# Patient Record
Sex: FEMALE | Race: OTHER | NOT HISPANIC OR LATINO | URBAN - METROPOLITAN AREA
[De-identification: names, ages, dates, MRNs, and addresses within clinical notes are randomized per-mention and may not be internally consistent; named-entity substitution may affect disease eponyms.]

---

## 2018-01-12 ENCOUNTER — INPATIENT (INPATIENT)
Facility: HOSPITAL | Age: 63
LOS: 3 days | Discharge: HOME | End: 2018-01-16
Attending: INTERNAL MEDICINE

## 2018-01-12 DIAGNOSIS — E78.5 HYPERLIPIDEMIA, UNSPECIFIED: ICD-10-CM

## 2018-01-12 DIAGNOSIS — M54.9 DORSALGIA, UNSPECIFIED: ICD-10-CM

## 2018-01-12 DIAGNOSIS — I82.409 ACUTE EMBOLISM AND THROMBOSIS OF UNSPECIFIED DEEP VEINS OF UNSPECIFIED LOWER EXTREMITY: ICD-10-CM

## 2018-01-12 DIAGNOSIS — I10 ESSENTIAL (PRIMARY) HYPERTENSION: ICD-10-CM

## 2018-01-22 DIAGNOSIS — Z86.718 PERSONAL HISTORY OF OTHER VENOUS THROMBOSIS AND EMBOLISM: ICD-10-CM

## 2018-01-22 DIAGNOSIS — M54.5 LOW BACK PAIN: ICD-10-CM

## 2018-01-22 DIAGNOSIS — E78.5 HYPERLIPIDEMIA, UNSPECIFIED: ICD-10-CM

## 2018-01-22 DIAGNOSIS — M51.16 INTERVERTEBRAL DISC DISORDERS WITH RADICULOPATHY, LUMBAR REGION: ICD-10-CM

## 2018-01-22 DIAGNOSIS — J45.909 UNSPECIFIED ASTHMA, UNCOMPLICATED: ICD-10-CM

## 2018-01-22 DIAGNOSIS — R26.2 DIFFICULTY IN WALKING, NOT ELSEWHERE CLASSIFIED: ICD-10-CM

## 2018-01-22 DIAGNOSIS — F32.9 MAJOR DEPRESSIVE DISORDER, SINGLE EPISODE, UNSPECIFIED: ICD-10-CM

## 2018-01-22 DIAGNOSIS — I10 ESSENTIAL (PRIMARY) HYPERTENSION: ICD-10-CM

## 2018-01-22 DIAGNOSIS — G89.29 OTHER CHRONIC PAIN: ICD-10-CM

## 2018-01-22 DIAGNOSIS — Z79.01 LONG TERM (CURRENT) USE OF ANTICOAGULANTS: ICD-10-CM

## 2018-01-22 PROBLEM — Z00.00 ENCOUNTER FOR PREVENTIVE HEALTH EXAMINATION: Status: ACTIVE | Noted: 2018-01-22

## 2022-03-02 ENCOUNTER — EMERGENCY (EMERGENCY)
Facility: HOSPITAL | Age: 67
LOS: 0 days | Discharge: HOME | End: 2022-03-02
Admitting: EMERGENCY MEDICINE

## 2022-03-02 ENCOUNTER — INPATIENT (INPATIENT)
Facility: HOSPITAL | Age: 67
LOS: 0 days | Discharge: HOME | End: 2022-03-03
Attending: INTERNAL MEDICINE | Admitting: INTERNAL MEDICINE
Payer: MEDICARE

## 2022-03-02 VITALS
OXYGEN SATURATION: 99 % | RESPIRATION RATE: 18 BRPM | HEART RATE: 90 BPM | SYSTOLIC BLOOD PRESSURE: 168 MMHG | DIASTOLIC BLOOD PRESSURE: 70 MMHG | TEMPERATURE: 97 F

## 2022-03-02 DIAGNOSIS — Z79.01 LONG TERM (CURRENT) USE OF ANTICOAGULANTS: ICD-10-CM

## 2022-03-02 DIAGNOSIS — J45.909 UNSPECIFIED ASTHMA, UNCOMPLICATED: ICD-10-CM

## 2022-03-02 DIAGNOSIS — R07.89 OTHER CHEST PAIN: ICD-10-CM

## 2022-03-02 DIAGNOSIS — R68.84 JAW PAIN: ICD-10-CM

## 2022-03-02 DIAGNOSIS — I10 ESSENTIAL (PRIMARY) HYPERTENSION: ICD-10-CM

## 2022-03-02 DIAGNOSIS — Z86.718 PERSONAL HISTORY OF OTHER VENOUS THROMBOSIS AND EMBOLISM: ICD-10-CM

## 2022-03-02 DIAGNOSIS — R06.02 SHORTNESS OF BREATH: ICD-10-CM

## 2022-03-02 DIAGNOSIS — K21.9 GASTRO-ESOPHAGEAL REFLUX DISEASE WITHOUT ESOPHAGITIS: ICD-10-CM

## 2022-03-02 DIAGNOSIS — I25.111 ATHEROSCLEROTIC HEART DISEASE OF NATIVE CORONARY ARTERY WITH ANGINA PECTORIS WITH DOCUMENTED SPASM: ICD-10-CM

## 2022-03-02 DIAGNOSIS — E78.5 HYPERLIPIDEMIA, UNSPECIFIED: ICD-10-CM

## 2022-03-02 LAB
ALBUMIN SERPL ELPH-MCNC: 4.4 G/DL — SIGNIFICANT CHANGE UP (ref 3.5–5.2)
ALP SERPL-CCNC: 96 U/L — SIGNIFICANT CHANGE UP (ref 30–115)
ALT FLD-CCNC: 16 U/L — SIGNIFICANT CHANGE UP (ref 0–41)
ANION GAP SERPL CALC-SCNC: 15 MMOL/L — HIGH (ref 7–14)
APTT BLD: 28 SEC — SIGNIFICANT CHANGE UP (ref 27–39.2)
AST SERPL-CCNC: 17 U/L — SIGNIFICANT CHANGE UP (ref 0–41)
BASOPHILS # BLD AUTO: 0.05 K/UL — SIGNIFICANT CHANGE UP (ref 0–0.2)
BASOPHILS NFR BLD AUTO: 1 % — SIGNIFICANT CHANGE UP (ref 0–1)
BILIRUB SERPL-MCNC: 0.3 MG/DL — SIGNIFICANT CHANGE UP (ref 0.2–1.2)
BUN SERPL-MCNC: 19 MG/DL — SIGNIFICANT CHANGE UP (ref 10–20)
CALCIUM SERPL-MCNC: 9.1 MG/DL — SIGNIFICANT CHANGE UP (ref 8.5–10.1)
CHLORIDE SERPL-SCNC: 109 MMOL/L — SIGNIFICANT CHANGE UP (ref 98–110)
CO2 SERPL-SCNC: 25 MMOL/L — SIGNIFICANT CHANGE UP (ref 17–32)
CREAT SERPL-MCNC: 0.7 MG/DL — SIGNIFICANT CHANGE UP (ref 0.7–1.5)
EGFR: 97 ML/MIN/1.73M2 — SIGNIFICANT CHANGE UP
EOSINOPHIL # BLD AUTO: 0.08 K/UL — SIGNIFICANT CHANGE UP (ref 0–0.7)
EOSINOPHIL NFR BLD AUTO: 1.6 % — SIGNIFICANT CHANGE UP (ref 0–8)
GLUCOSE SERPL-MCNC: 82 MG/DL — SIGNIFICANT CHANGE UP (ref 70–99)
HCT VFR BLD CALC: 49.7 % — HIGH (ref 37–47)
HGB BLD-MCNC: 15.3 G/DL — SIGNIFICANT CHANGE UP (ref 12–16)
IMM GRANULOCYTES NFR BLD AUTO: 0.4 % — HIGH (ref 0.1–0.3)
INR BLD: 0.87 RATIO — SIGNIFICANT CHANGE UP (ref 0.65–1.3)
LYMPHOCYTES # BLD AUTO: 2.16 K/UL — SIGNIFICANT CHANGE UP (ref 1.2–3.4)
LYMPHOCYTES # BLD AUTO: 43.9 % — SIGNIFICANT CHANGE UP (ref 20.5–51.1)
MAGNESIUM SERPL-MCNC: 1.9 MG/DL — SIGNIFICANT CHANGE UP (ref 1.8–2.4)
MCHC RBC-ENTMCNC: 24.7 PG — LOW (ref 27–31)
MCHC RBC-ENTMCNC: 30.8 G/DL — LOW (ref 32–37)
MCV RBC AUTO: 80.2 FL — LOW (ref 81–99)
MONOCYTES # BLD AUTO: 0.47 K/UL — SIGNIFICANT CHANGE UP (ref 0.1–0.6)
MONOCYTES NFR BLD AUTO: 9.6 % — HIGH (ref 1.7–9.3)
NEUTROPHILS # BLD AUTO: 2.14 K/UL — SIGNIFICANT CHANGE UP (ref 1.4–6.5)
NEUTROPHILS NFR BLD AUTO: 43.5 % — SIGNIFICANT CHANGE UP (ref 42.2–75.2)
NRBC # BLD: 0 /100 WBCS — SIGNIFICANT CHANGE UP (ref 0–0)
NT-PROBNP SERPL-SCNC: 116 PG/ML — SIGNIFICANT CHANGE UP (ref 0–300)
PLATELET # BLD AUTO: 210 K/UL — SIGNIFICANT CHANGE UP (ref 130–400)
POTASSIUM SERPL-MCNC: 4.5 MMOL/L — SIGNIFICANT CHANGE UP (ref 3.5–5)
POTASSIUM SERPL-SCNC: 4.5 MMOL/L — SIGNIFICANT CHANGE UP (ref 3.5–5)
PROT SERPL-MCNC: 6.8 G/DL — SIGNIFICANT CHANGE UP (ref 6–8)
PROTHROM AB SERPL-ACNC: 10 SEC — SIGNIFICANT CHANGE UP (ref 9.95–12.87)
RBC # BLD: 6.2 M/UL — HIGH (ref 4.2–5.4)
RBC # FLD: 15.5 % — HIGH (ref 11.5–14.5)
SARS-COV-2 RNA SPEC QL NAA+PROBE: SIGNIFICANT CHANGE UP
SODIUM SERPL-SCNC: 149 MMOL/L — HIGH (ref 135–146)
TROPONIN T SERPL-MCNC: <0.01 NG/ML — SIGNIFICANT CHANGE UP
WBC # BLD: 4.92 K/UL — SIGNIFICANT CHANGE UP (ref 4.8–10.8)
WBC # FLD AUTO: 4.92 K/UL — SIGNIFICANT CHANGE UP (ref 4.8–10.8)

## 2022-03-02 PROCEDURE — 93306 TTE W/DOPPLER COMPLETE: CPT | Mod: 26

## 2022-03-02 PROCEDURE — 99285 EMERGENCY DEPT VISIT HI MDM: CPT | Mod: CS

## 2022-03-02 PROCEDURE — 93010 ELECTROCARDIOGRAM REPORT: CPT

## 2022-03-02 PROCEDURE — 99222 1ST HOSP IP/OBS MODERATE 55: CPT

## 2022-03-02 RX ORDER — RIVAROXABAN 15 MG-20MG
20 KIT ORAL
Refills: 0 | Status: DISCONTINUED | OUTPATIENT
Start: 2022-03-02 | End: 2022-03-03

## 2022-03-02 RX ORDER — ASPIRIN/CALCIUM CARB/MAGNESIUM 324 MG
325 TABLET ORAL ONCE
Refills: 0 | Status: COMPLETED | OUTPATIENT
Start: 2022-03-02 | End: 2022-03-02

## 2022-03-02 RX ORDER — FAMOTIDINE 10 MG/ML
40 INJECTION INTRAVENOUS
Refills: 0 | Status: DISCONTINUED | OUTPATIENT
Start: 2022-03-02 | End: 2022-03-03

## 2022-03-02 RX ORDER — MONTELUKAST 4 MG/1
10 TABLET, CHEWABLE ORAL DAILY
Refills: 0 | Status: DISCONTINUED | OUTPATIENT
Start: 2022-03-02 | End: 2022-03-03

## 2022-03-02 RX ORDER — SODIUM CHLORIDE 9 MG/ML
1000 INJECTION INTRAMUSCULAR; INTRAVENOUS; SUBCUTANEOUS
Refills: 0 | Status: DISCONTINUED | OUTPATIENT
Start: 2022-03-02 | End: 2022-03-02

## 2022-03-02 RX ORDER — ATORVASTATIN CALCIUM 80 MG/1
40 TABLET, FILM COATED ORAL AT BEDTIME
Refills: 0 | Status: DISCONTINUED | OUTPATIENT
Start: 2022-03-02 | End: 2022-03-03

## 2022-03-02 RX ORDER — ASPIRIN/CALCIUM CARB/MAGNESIUM 324 MG
81 TABLET ORAL DAILY
Refills: 0 | Status: DISCONTINUED | OUTPATIENT
Start: 2022-03-02 | End: 2022-03-02

## 2022-03-02 RX ORDER — CYCLOBENZAPRINE HYDROCHLORIDE 10 MG/1
10 TABLET, FILM COATED ORAL THREE TIMES A DAY
Refills: 0 | Status: DISCONTINUED | OUTPATIENT
Start: 2022-03-02 | End: 2022-03-03

## 2022-03-02 RX ORDER — OXYCODONE HYDROCHLORIDE 5 MG/1
10 TABLET ORAL EVERY 12 HOURS
Refills: 0 | Status: DISCONTINUED | OUTPATIENT
Start: 2022-03-02 | End: 2022-03-03

## 2022-03-02 RX ORDER — MORPHINE SULFATE 50 MG/1
2 CAPSULE, EXTENDED RELEASE ORAL ONCE
Refills: 0 | Status: DISCONTINUED | OUTPATIENT
Start: 2022-03-02 | End: 2022-03-02

## 2022-03-02 RX ORDER — INFLUENZA VIRUS VACCINE 15; 15; 15; 15 UG/.5ML; UG/.5ML; UG/.5ML; UG/.5ML
0.5 SUSPENSION INTRAMUSCULAR ONCE
Refills: 0 | Status: DISCONTINUED | OUTPATIENT
Start: 2022-03-02 | End: 2022-03-03

## 2022-03-02 RX ADMIN — Medication 325 MILLIGRAM(S): at 10:06

## 2022-03-02 RX ADMIN — MORPHINE SULFATE 2 MILLIGRAM(S): 50 CAPSULE, EXTENDED RELEASE ORAL at 15:34

## 2022-03-02 RX ADMIN — MORPHINE SULFATE 2 MILLIGRAM(S): 50 CAPSULE, EXTENDED RELEASE ORAL at 15:09

## 2022-03-02 RX ADMIN — MONTELUKAST 10 MILLIGRAM(S): 4 TABLET, CHEWABLE ORAL at 23:06

## 2022-03-02 RX ADMIN — FAMOTIDINE 40 MILLIGRAM(S): 10 INJECTION INTRAVENOUS at 17:19

## 2022-03-02 RX ADMIN — ATORVASTATIN CALCIUM 40 MILLIGRAM(S): 80 TABLET, FILM COATED ORAL at 21:19

## 2022-03-02 RX ADMIN — RIVAROXABAN 20 MILLIGRAM(S): KIT at 21:19

## 2022-03-02 NOTE — H&P CARDIOLOGY - COMMENTS
CP concerning for ACS  HTN  HLD    - STEMI code cancelled.   - Given high Ronda score, will take patient urgently for cath.  - Loaded with ASA 324mg in ED.  - Tele monitoring

## 2022-03-02 NOTE — ED ADULT TRIAGE NOTE - TEMPERATURE IN FAHRENHEIT (DEGREES F)
Problem: Anemia (Adult)  Goal: Identify Related Risk Factors and Signs and Symptoms  Related risk factors and signs and symptoms are identified upon initiation of Human Response Clinical Practice Guideline (CPG)   Outcome: Ongoing (interventions implemented as appropriate)  Pt here for blood and platelet transfusion. VSS.  No complaints  voiced.Consent/labs/meds/allergies/treatment reviewed.  Accessed per flowsheet.  All questions asked were answered. Will Continue to monitor        
Problem: Patient Care Overview  Goal: Plan of Care Review  Outcome: Ongoing (interventions implemented as appropriate)  Patient received 1 unit of blood and 1 unit of platelet without any issues. Notified to call MD with any further concerns . Vss. No apparent distress noted.         
97

## 2022-03-02 NOTE — ED PROVIDER NOTE - CLINICAL SUMMARY MEDICAL DECISION MAKING FREE TEXT BOX
I have personally performed a face to face diagnostic evaluation on this patient. I have reviewed the ACP note and agree with the history, exam and plan of care, except as noted.   66-year-old female with history of hypertension, hyperlipidemia, DVTs on Xarelto, compliant with this, non-smoker, no family history of CAD, presents with midsternal chest pressure radiating to the left jaw x2 days, worse with exertion. Associated dyspnea on exertion. Denies all other symptoms including fever, cough. Last COVID vaccine many months ago. On exam, afebrile, hemodynamically stable, saturating well, NAD, well appearing, sitting comfortably in bed, no WOB, speaking full sentences, head NCAT, EOMI grossly, anicteric, MMM, no JVD, RRR, nml S1/S2, no m/r/g, lungs CTAB, no w/r/r, abd soft, NT, ND, nml BS, no rebound or guarding, AAO, CN's 3-12 grossly intact, BOURGEOIS spontaneously, no leg cyanosis or edema, 2+ symmetric radial pulses, skin warm, well perfused, no rashes or hives. Character low suspicion for dissection and no murmur or pulse asymmetry. Character low suspicion for PE and no tachycardia, hypoxia, or e/o DVT and patient compliant on her Xarelto. No e/o PNA, PTX, or fluid overload on exam. Character of concern for ACS and ECG with some aVR elevation/II/III/aVF reciprocal changes. Code STEMI called on arrival and co-managed with cardiology at bedside, taking to cath. Patient well appearing, hemodynamically stable. Given ASA. Admitted to CCU for further monitoring, w/u, and care.

## 2022-03-02 NOTE — ED PROVIDER NOTE - OBJECTIVE STATEMENT
65 yo female, pmh of htn, hld, asthma, no tob use, no fh, left dvt on xarelto, presents to ed for cp, x3 days, midsternal, mild, radiates to left jaw. Denies fever, chills, abd pain, nvd. A/w exertional sob.

## 2022-03-02 NOTE — H&P CARDIOLOGY - OTHER
FINDINGS:     Coronary Dominance: right dominant    LM: normal    LAD: minor luminal irregularities  D1: mild disease  D2: minor irregularities    LCX: minor irregularities  OM1: 50% stenosis in the distal third of the vessel  OM2: normal    RCA: minor luminal irregularities     LVEDP: 24 mmHg     EF: 60% on LV gram

## 2022-03-02 NOTE — CONSULT NOTE ADULT - SUBJECTIVE AND OBJECTIVE BOX
HISTORY OF PRESENT ILLNESS:       PAST MEDICAL & SURGICAL HISTORY  HTN (hypertension)        FAMILY HISTORY:  FAMILY HISTORY:      SOCIAL HISTORY:  []smoker  []Alcohol  []Drug    ROS:  Negative except as mentioned in HPI    ALLERGIES:  No Known Allergies      MEDICATIONS:  MEDICATIONS  (STANDING):    MEDICATIONS  (PRN):      HOME MEDICATIONS:  Home Medications:      VITALS:   T(F): 97 (03-02 @ 09:45), Max: 97 (03-02 @ 09:45)  HR: 93 (03-02 @ 09:58) (90 - 93)  BP: 172/96 (03-02 @ 09:58) (168/70 - 172/96)  BP(mean): --  RR: 18 (03-02 @ 09:58) (18 - 18)  SpO2: 99% (03-02 @ 09:58) (99% - 99%)    I&O's Summary      PHYSICAL EXAM:  GEN: Not in acute distress  HEENT: NCAT, PERRL, EOMI  LUNGS: Clear to auscultation bilaterally   CARDIOVASCULAR: RRR, S1/S2 present, no murmurs, rubs or gallops, no JVD, + PP bilaterally  ABD: Soft, non-tender, non-distended  EXT: No YEFRI  SKIN: Intact  NEURO: AAOx3    LABS:                    Hemoglobin A1C   Thyroid      -CXR:  -TTE:  -CCTA:  -STRESS TEST:  -CATHETERIZATION:  -ECG:   -Telemetry:   HISTORY OF PRESENT ILLNESS:   67yo F hx of HTN, HLD, DVT on xarelto presented with cc of CP for past three days. Patient described the pain as acute onset. Left sided, non-pleuritic, non-reproducible in nature. Pain worsens with exertion and associated with SOB. Patient seen by PCP yesterday and was told to came to ED. In the ED, patient remains HD stable. Continue to experience pain.    PAST MEDICAL & SURGICAL HISTORY  HTN (hypertension)        FAMILY HISTORY:  FAMILY HISTORY:      SOCIAL HISTORY:  []smoker  []Alcohol  []Drug    ROS:  Negative except as mentioned in HPI    ALLERGIES:  No Known Allergies      MEDICATIONS:  MEDICATIONS  (STANDING):    MEDICATIONS  (PRN):      HOME MEDICATIONS:  Home Medications:      VITALS:   T(F): 97 (03-02 @ 09:45), Max: 97 (03-02 @ 09:45)  HR: 93 (03-02 @ 09:58) (90 - 93)  BP: 172/96 (03-02 @ 09:58) (168/70 - 172/96)  BP(mean): --  RR: 18 (03-02 @ 09:58) (18 - 18)  SpO2: 99% (03-02 @ 09:58) (99% - 99%)    I&O's Summary      PHYSICAL EXAM:  GEN: Not in acute distress  HEENT: NCAT, PERRL, EOMI  LUNGS: Clear to auscultation bilaterally   CARDIOVASCULAR: RRR, S1/S2 present, no murmurs, rubs or gallops, no JVD, + PP bilaterally  ABD: Soft, non-tender, non-distended  EXT: No YEFRI  SKIN: Intact  NEURO: AAOx3    LABS:  pending            Hemoglobin A1C   Thyroid      EKG: NSR, ST depression in inferior leads.

## 2022-03-02 NOTE — H&P CARDIOLOGY - HISTORY OF PRESENT ILLNESS
67 YO F hx of Chronic Essential HTN, HLD, DVT on Xarelto presented with complaints of CP x 3 days PTA Patient described the pain as acute onset. Left sided, non-pleuritic, non-reproducible in nature. Pain worsens with exertion and associated with SOB. Patient seen by PCP yesterday and was told to came to ED. In the ED, patient remained HD stable. Continued to experience pain. Code STEMI called d/t small BRAD in aVR but then cancelled, d/t high baldemar score taken urgently for LHC which revealed, small OM1 Dz.      Vital Signs Last 24 Hrs  T(F): 96.6 (02 Mar 2022 12:00), Max: 97 (02 Mar 2022 09:45)  HR: 66 (02 Mar 2022 13:00) (63 - 93)  BP: 152/81 (02 Mar 2022 13:00) (147/73 - 172/96)  BP(mean): 110 (02 Mar 2022 13:00) (101 - 110)  RR: 16 (02 Mar 2022 13:00) (14 - 31)  SpO2: 97% (02 Mar 2022 13:00) (97% - 99%)

## 2022-03-02 NOTE — CONSULT NOTE ADULT - ASSESSMENT
CP concerning for ACS  HTN  HLD    - STEMI code cancelled.   - Given high Ronda score, will take patient urgently for cath.  - Loaded with ASA 324mg in ED.  - Tele monitoring.  - Check TTE.  - Plan discussed with on call attending.

## 2022-03-02 NOTE — ED PROVIDER NOTE - CRITICAL CARE ATTENDING CONTRIBUTION TO CARE
I have personally performed a face to face diagnostic evaluation on this patient. I have reviewed the ACP note and agree with the history, exam and plan of care, except as noted.   66-year-old female with history of hypertension, hyperlipidemia, DVTs on Xarelto, compliant with this, non-smoker, no family history of CAD, presents with midsternal chest pressure radiating to the left jaw x2 days, worse with exertion. Associated dyspnea on exertion. Denies all other symptoms including fever, cough. Last COVID vaccine many months ago. On exam, afebrile, hemodynamically stable, saturating well, NAD, well appearing, sitting comfortably in bed, no WOB, speaking full sentences, head NCAT, EOMI grossly, anicteric, MMM, no JVD, RRR, nml S1/S2, no m/r/g, lungs CTAB, no w/r/r, abd soft, NT, ND, nml BS, no rebound or guarding, AAO, CN's 3-12 grossly intact, BOURGEOIS spontaneously, no leg cyanosis or edema, 2+ symmetric radial pulses, skin warm, well perfused, no rashes or hives. Character low suspicion for dissection and no murmur or pulse asymmetry. Character low suspicion for PE and no tachycardia, hypoxia, or e/o DVT and patient compliant on her Xarelto. No e/o PNA, PTX, or fluid overload on exam or CXR __________. Character of concern for ACS and ECG with some aVR elevation/II/III/aVF reciprocal changes. Code STEMI called on arrival and co-managed with cardiology at bedside. I have personally performed a face to face diagnostic evaluation on this patient. I have reviewed the ACP note and agree with the history, exam and plan of care, except as noted.   66-year-old female with history of hypertension, hyperlipidemia, DVTs on Xarelto, compliant with this, non-smoker, no family history of CAD, presents with midsternal chest pressure radiating to the left jaw x2 days, worse with exertion. Associated dyspnea on exertion. Denies all other symptoms including fever, cough. Last COVID vaccine many months ago. On exam, afebrile, hemodynamically stable, saturating well, NAD, well appearing, sitting comfortably in bed, no WOB, speaking full sentences, head NCAT, EOMI grossly, anicteric, MMM, no JVD, RRR, nml S1/S2, no m/r/g, lungs CTAB, no w/r/r, abd soft, NT, ND, nml BS, no rebound or guarding, AAO, CN's 3-12 grossly intact, BOURGEOIS spontaneously, no leg cyanosis or edema, 2+ symmetric radial pulses, skin warm, well perfused, no rashes or hives. Character low suspicion for dissection and no murmur or pulse asymmetry. Character low suspicion for PE and no tachycardia, hypoxia, or e/o DVT and patient compliant on her Xarelto. No e/o PNA, PTX, or fluid overload on exam. Character of concern for ACS and ECG with some aVR elevation/II/III/aVF reciprocal changes. Code STEMI called on arrival and co-managed with cardiology at bedside, taking to cath. Patient well appearing, hemodynamically stable. Given ASA. Admitted to CCU for further monitoring, w/u, and care.

## 2022-03-02 NOTE — CHART NOTE - NSCHARTNOTEFT_GEN_A_CORE
PRE-OP DIAGNOSIS:    STEMI Code, chest pain    PROCEDURE:     [x] Coronary Angiogram     [x] LHC     [x] LVG     [] RHC     [] Intervention (see below)         PHYSICIAN:  Dr. Lino    ASSISTANT:  Dr. Von Harrell       PROCEDURE DESCRIPTION:     Consent:      [x] Patient     [] Family Member     []  Used        Anesthesia:     [] General     [x] Sedation     [x] Local        Access & Closure:     [x] 6 Fr right Radial Artery (D-stat)    [] Fr Femoral Artery     [] Fr Femoral Vein     [] Fr Brachial Vein       IV Contrast: 60 mL        Intervention: none    Implants: none       FINDINGS:     Coronary Dominance: right dominant    LM: normal    LAD: minor luminal irregularities  D1: mild disease  D2: minor irregularities    LCX: minor irregularities  OM1: 50% stenosis in the distal third of the vessel  OM2: normal    RCA: minor luminal irregularities     LVEDP: 24 mmHg     EF: 60% on LV gram       ESTIMATED BLOOD LOSS: < 10 mL        CONDITION:     [x] Good     [] Fair     [] Critical        SPECIMEN REMOVED: N/A       POST-OP DIAGNOSIS:      [] Normal Coronary Angiogram     [x] Mild Coronary Artery Disease (< 50% stenosis)     [] __ Vessel Coronary Artery Disease        PLAN OF CARE:     [x] Return to In-patient bed     [x] Medications:   - continue with ASA 81mg, statin, and resume Xarelto (for hx of DVT) tonight    [x] IV Fluids: NS@100cc/hr x 4 hours    Consider ruling out PE, obtain 2d echo PRE-OP DIAGNOSIS:    STEMI Code, chest pain    PROCEDURE:     [x] Coronary Angiogram     [x] LHC     [x] LVG     [] RHC     [] Intervention (see below)         PHYSICIAN:  Dr. Lino    ASSISTANT:  Dr. Von Harrell       PROCEDURE DESCRIPTION:     Consent:      [x] Patient     [] Family Member     []  Used        Anesthesia:     [] General     [x] Sedation     [x] Local        Access & Closure:     [x] 6 Fr right Radial Artery (D-stat)    [] Fr Femoral Artery     [] Fr Femoral Vein     [] Fr Brachial Vein       IV Contrast: 60 mL        Intervention: none    Implants: none       FINDINGS:     Coronary Dominance: right dominant    LM: normal    LAD: minor luminal irregularities  D1: mild disease  D2: minor irregularities    LCX: minor irregularities  OM1: 50% stenosis in the distal third of the vessel  OM2: normal    RCA: minor luminal irregularities     LVEDP: 24 mmHg     EF: 60% on LV gram       ESTIMATED BLOOD LOSS: < 10 mL        CONDITION:     [x] Good     [] Fair     [] Critical        SPECIMEN REMOVED: N/A       POST-OP DIAGNOSIS:      [] Normal Coronary Angiogram     [x] Mild Coronary Artery Disease (< 50% stenosis)     [] __ Vessel Coronary Artery Disease        PLAN OF CARE:     [x] Return to In-patient bed     [x] Medications:   - Resume Xarelto (for hx of DVT) tonight  - check lipid profile and HBA1C    [x] IV Fluids: NS@100cc/hr x 4 hours    Consider ruling out PE, obtain 2d echo

## 2022-03-02 NOTE — ED PROVIDER NOTE - PROGRESS NOTE DETAILS
Per cardio, ASA alone at this time, CCU to [facility]. Per cardio Dr. Price, ASA alone at this time, CCU to [facility]. Per cardio Albert, will take fro cath within next 20 mins. stemi code cancelled. pt taken at this time to cath suite, per fellow d/c cxr. cards resident aware of pt.

## 2022-03-02 NOTE — ED PROVIDER NOTE - CARE PLAN
1 Principal Discharge DX:	Chest pain   Principal Discharge DX:	Chest pain, rule out acute myocardial infarction

## 2022-03-02 NOTE — ED ADULT NURSE NOTE - NSIMPLEMENTINTERV_GEN_ALL_ED
Implemented All Universal Safety Interventions:  [place] to call system. Call bell, personal items and telephone within reach. Instruct patient to call for assistance. Room bathroom lighting operational. Non-slip footwear when patient is off stretcher. Physically safe environment: no spills, clutter or unnecessary equipment. Stretcher in lowest position, wheels locked, appropriate side rails in place.

## 2022-03-02 NOTE — PATIENT PROFILE ADULT - FALL HARM RISK - HARM RISK INTERVENTIONS

## 2022-03-03 ENCOUNTER — TRANSCRIPTION ENCOUNTER (OUTPATIENT)
Age: 67
End: 2022-03-03

## 2022-03-03 VITALS — SYSTOLIC BLOOD PRESSURE: 131 MMHG | DIASTOLIC BLOOD PRESSURE: 71 MMHG

## 2022-03-03 LAB
A1C WITH ESTIMATED AVERAGE GLUCOSE RESULT: 6 % — HIGH (ref 4–5.6)
ANION GAP SERPL CALC-SCNC: 10 MMOL/L — SIGNIFICANT CHANGE UP (ref 7–14)
BUN SERPL-MCNC: 13 MG/DL — SIGNIFICANT CHANGE UP (ref 10–20)
CALCIUM SERPL-MCNC: 9.1 MG/DL — SIGNIFICANT CHANGE UP (ref 8.5–10.1)
CHLORIDE SERPL-SCNC: 104 MMOL/L — SIGNIFICANT CHANGE UP (ref 98–110)
CHOLEST SERPL-MCNC: 262 MG/DL — HIGH
CO2 SERPL-SCNC: 27 MMOL/L — SIGNIFICANT CHANGE UP (ref 17–32)
CREAT SERPL-MCNC: 0.7 MG/DL — SIGNIFICANT CHANGE UP (ref 0.7–1.5)
EGFR: 97 ML/MIN/1.73M2 — SIGNIFICANT CHANGE UP
ESTIMATED AVERAGE GLUCOSE: 126 MG/DL — HIGH (ref 68–114)
GLUCOSE SERPL-MCNC: 101 MG/DL — HIGH (ref 70–99)
HCT VFR BLD CALC: 41 % — SIGNIFICANT CHANGE UP (ref 37–47)
HDLC SERPL-MCNC: 78 MG/DL — SIGNIFICANT CHANGE UP
HGB BLD-MCNC: 13.2 G/DL — SIGNIFICANT CHANGE UP (ref 12–16)
LIPID PNL WITH DIRECT LDL SERPL: 175 MG/DL — HIGH
MAGNESIUM SERPL-MCNC: 2 MG/DL — SIGNIFICANT CHANGE UP (ref 1.8–2.4)
MCHC RBC-ENTMCNC: 25.5 PG — LOW (ref 27–31)
MCHC RBC-ENTMCNC: 32.2 G/DL — SIGNIFICANT CHANGE UP (ref 32–37)
MCV RBC AUTO: 79.2 FL — LOW (ref 81–99)
NON HDL CHOLESTEROL: 184 MG/DL — HIGH
NRBC # BLD: 0 /100 WBCS — SIGNIFICANT CHANGE UP (ref 0–0)
PLATELET # BLD AUTO: 207 K/UL — SIGNIFICANT CHANGE UP (ref 130–400)
POTASSIUM SERPL-MCNC: 4.7 MMOL/L — SIGNIFICANT CHANGE UP (ref 3.5–5)
POTASSIUM SERPL-SCNC: 4.7 MMOL/L — SIGNIFICANT CHANGE UP (ref 3.5–5)
RBC # BLD: 5.18 M/UL — SIGNIFICANT CHANGE UP (ref 4.2–5.4)
RBC # FLD: 15.4 % — HIGH (ref 11.5–14.5)
SODIUM SERPL-SCNC: 141 MMOL/L — SIGNIFICANT CHANGE UP (ref 135–146)
TRIGL SERPL-MCNC: 94 MG/DL — SIGNIFICANT CHANGE UP
WBC # BLD: 5.53 K/UL — SIGNIFICANT CHANGE UP (ref 4.8–10.8)
WBC # FLD AUTO: 5.53 K/UL — SIGNIFICANT CHANGE UP (ref 4.8–10.8)

## 2022-03-03 PROCEDURE — 93010 ELECTROCARDIOGRAM REPORT: CPT

## 2022-03-03 PROCEDURE — 71045 X-RAY EXAM CHEST 1 VIEW: CPT | Mod: 26

## 2022-03-03 PROCEDURE — 71275 CT ANGIOGRAPHY CHEST: CPT | Mod: 26

## 2022-03-03 PROCEDURE — 99233 SBSQ HOSP IP/OBS HIGH 50: CPT

## 2022-03-03 RX ORDER — CYCLOBENZAPRINE HYDROCHLORIDE 10 MG/1
0 TABLET, FILM COATED ORAL
Qty: 0 | Refills: 0 | DISCHARGE

## 2022-03-03 RX ORDER — AMLODIPINE BESYLATE 2.5 MG/1
1 TABLET ORAL
Qty: 30 | Refills: 0
Start: 2022-03-03 | End: 2022-04-01

## 2022-03-03 RX ORDER — AMLODIPINE BESYLATE 2.5 MG/1
5 TABLET ORAL DAILY
Refills: 0 | Status: DISCONTINUED | OUTPATIENT
Start: 2022-03-03 | End: 2022-03-03

## 2022-03-03 RX ORDER — CYCLOBENZAPRINE HYDROCHLORIDE 10 MG/1
1 TABLET, FILM COATED ORAL
Qty: 0 | Refills: 0 | DISCHARGE
Start: 2022-03-03

## 2022-03-03 RX ORDER — MONTELUKAST 4 MG/1
0 TABLET, CHEWABLE ORAL
Qty: 0 | Refills: 2 | DISCHARGE

## 2022-03-03 RX ORDER — ATORVASTATIN CALCIUM 80 MG/1
1 TABLET, FILM COATED ORAL
Qty: 30 | Refills: 0
Start: 2022-03-03 | End: 2022-04-01

## 2022-03-03 RX ORDER — FAMOTIDINE 10 MG/ML
0 INJECTION INTRAVENOUS
Qty: 0 | Refills: 1 | DISCHARGE

## 2022-03-03 RX ORDER — PANTOPRAZOLE SODIUM 20 MG/1
1 TABLET, DELAYED RELEASE ORAL
Qty: 30 | Refills: 0
Start: 2022-03-03 | End: 2022-04-01

## 2022-03-03 RX ORDER — RIVAROXABAN 15 MG-20MG
1 KIT ORAL
Qty: 30 | Refills: 2
Start: 2022-03-03 | End: 2022-05-31

## 2022-03-03 RX ORDER — OXYCODONE HYDROCHLORIDE 5 MG/1
0 TABLET ORAL
Qty: 0 | Refills: 0 | DISCHARGE

## 2022-03-03 RX ORDER — MONTELUKAST 4 MG/1
1 TABLET, CHEWABLE ORAL
Qty: 0 | Refills: 0 | DISCHARGE
Start: 2022-03-03

## 2022-03-03 RX ORDER — RIVAROXABAN 15 MG-20MG
0 KIT ORAL
Qty: 0 | Refills: 2 | DISCHARGE

## 2022-03-03 RX ORDER — ONDANSETRON 8 MG/1
4 TABLET, FILM COATED ORAL ONCE
Refills: 0 | Status: COMPLETED | OUTPATIENT
Start: 2022-03-03 | End: 2022-03-03

## 2022-03-03 RX ADMIN — FAMOTIDINE 40 MILLIGRAM(S): 10 INJECTION INTRAVENOUS at 17:51

## 2022-03-03 RX ADMIN — OXYCODONE HYDROCHLORIDE 10 MILLIGRAM(S): 5 TABLET ORAL at 04:57

## 2022-03-03 RX ADMIN — ONDANSETRON 4 MILLIGRAM(S): 8 TABLET, FILM COATED ORAL at 08:52

## 2022-03-03 RX ADMIN — FAMOTIDINE 40 MILLIGRAM(S): 10 INJECTION INTRAVENOUS at 07:04

## 2022-03-03 RX ADMIN — RIVAROXABAN 20 MILLIGRAM(S): KIT at 17:51

## 2022-03-03 RX ADMIN — MONTELUKAST 10 MILLIGRAM(S): 4 TABLET, CHEWABLE ORAL at 12:31

## 2022-03-03 RX ADMIN — AMLODIPINE BESYLATE 5 MILLIGRAM(S): 2.5 TABLET ORAL at 12:31

## 2022-03-03 NOTE — DISCHARGE NOTE PROVIDER - NSDCMRMEDTOKEN_GEN_ALL_CORE_FT
cyclobenzaprine 10 mg oral tablet: 1 tab(s) orally 3 times a day, As needed, Muscle Spasm  Lipitor 40 mg oral tablet: 1 tab(s) orally once a day (at bedtime)  montelukast 10 mg oral tablet: 1 tab(s) orally once a day  Norvasc 5 mg oral tablet: 1 tab(s) orally once a day  pantoprazole 40 mg oral delayed release tablet: 1 tab(s) orally once a day   Xarelto 20 mg tablet: 1 tab(s) orally once a day

## 2022-03-03 NOTE — PHYSICAL THERAPY INITIAL EVALUATION ADULT - PERTINENT HX OF CURRENT PROBLEM, REHAB EVAL
65 YO F hx of Chronic Essential HTN, HLD, DVT on Xarelto presented with complaints of CP x 3 days PTA Patient described the pain as acute onset. Left sided, non-pleuritic, non-reproducible in nature. Pain worsens with exertion and associated with SOB. Patient seen by PCP yesterday and was told to came to ED.

## 2022-03-03 NOTE — PROGRESS NOTE ADULT - SUBJECTIVE AND OBJECTIVE BOX
PATIENT:  HAKEEM REY  [id number]      CHIEF COMPLAINT:  Patient is a 66y old  Female who presents with a chief complaint of       HPI:      INTERVAL HISTORY/OVERNIGHT EVENTS:      MEDICATIONS:  MEDICATIONS  (STANDING):  atorvastatin 40 milliGRAM(s) Oral at bedtime  famotidine    Tablet 40 milliGRAM(s) Oral two times a day  influenza   Vaccine 0.5 milliLiter(s) IntraMuscular once  montelukast 10 milliGRAM(s) Oral daily  rivaroxaban 20 milliGRAM(s) Oral with dinner    MEDICATIONS  (PRN):  cyclobenzaprine 10 milliGRAM(s) Oral three times a day PRN Muscle Spasm  oxyCODONE    IR 10 milliGRAM(s) Oral every 12 hours PRN Moderate Pain (4 - 6)      ALLERGIES:  Allergies    No Known Allergies    Intolerances        OBJECTIVE:  ICU Vital Signs Last 24 Hrs  T(C): 36.4 (02 Mar 2022 20:00), Max: 36.4 (02 Mar 2022 16:00)  T(F): 97.6 (02 Mar 2022 20:00), Max: 97.6 (02 Mar 2022 20:00)  HR: 68 (03 Mar 2022 07:00) (63 - 93)  BP: 133/63 (03 Mar 2022 07:00) (112/57 - 172/96)  BP(mean): 90 (03 Mar 2022 07:00) (81 - 124)  ABP: --  ABP(mean): --  RR: 22 (03 Mar 2022 07:00) (13 - 31)  SpO2: 95% (03 Mar 2022 07:00) (95% - 99%)      Adult Advanced Hemodynamics Last 24 Hrs  CVP(mm Hg): --  CVP(cm H2O): --  CO: --  CI: --  PA: --  PA(mean): --  PCWP: --  SVR: --  SVRI: --  PVR: --  PVRI: --  CAPILLARY BLOOD GLUCOSE        CAPILLARY BLOOD GLUCOSE        I&O's Summary    02 Mar 2022 07:01  -  03 Mar 2022 07:00  --------------------------------------------------------  IN: 740 mL / OUT: 2525 mL / NET: -1785 mL      Daily Height in cm: 165.1 (02 Mar 2022 12:00)    Daily     PHYSICAL EXAMINATION:  General: WN/WD NAD  HEENT: PERRLA, EOMI, moist mucous membranes  Neurology: A&Ox3, nonfocal, BOURGEOIS x 4  Respiratory: CTA B/L, normal respiratory effort, no wheezes, crackles, rales  CV: RRR, S1S2, no murmurs, rubs or gallops  Abdominal: Soft, NT, ND +BS, Last BM  Extremities: No edema, + peripheral pulses  Incisions:   Tubes:    LABS:                          13.2   5.53  )-----------( 207      ( 03 Mar 2022 04:45 )             41.0     03-03    141  |  104  |  13  ----------------------------<  101<H>  4.7   |  27  |  0.7    Ca    9.1      03 Mar 2022 04:45  Mg     2.0     03-03    TPro  6.8  /  Alb  4.4  /  TBili  0.3  /  DBili  x   /  AST  17  /  ALT  16  /  AlkPhos  96  03-02    LIVER FUNCTIONS - ( 02 Mar 2022 09:56 )  Alb: 4.4 g/dL / Pro: 6.8 g/dL / ALK PHOS: 96 U/L / ALT: 16 U/L / AST: 17 U/L / GGT: x           PT/INR - ( 02 Mar 2022 09:56 )   PT: 10.00 sec;   INR: 0.87 ratio         PTT - ( 02 Mar 2022 09:56 )  PTT:28.0 sec  Troponin T, Serum: <0.01 ng/mL (03-02 @ 09:56)    CARDIAC MARKERS ( 02 Mar 2022 09:56 )  x     / <0.01 ng/mL / x     / x     / x              TELEMETRY:    EKG:     IMAGING:   PATIENT:  HAKEEM REY  [id number]      CHIEF COMPLAINT:  Patient is a 66y old  Female who presents with a chief complaint of chest pain      HPI:  65 YO F hx of Chronic Essential HTN, HLD, DVT on Xarelto presented with complaints of CP x 3 days PTA Patient described the pain as acute onset. Left sided, non-pleuritic, non-reproducible in nature. Pain worsens with exertion and associated with SOB. Patient seen by PCP yesterday and was told to came to ED. In the ED, patient remained HD stable. Continued to experience pain. Code STEMI called d/t small BRAD in aVR but then cancelled, d/t high baldemar score taken urgently for LHC which revealed, small OM1 Dz.    INTERVAL HISTORY/OVERNIGHT EVENTS:  had one episode of vomiting    MEDICATIONS:  MEDICATIONS  (STANDING):  atorvastatin 40 milliGRAM(s) Oral at bedtime  famotidine    Tablet 40 milliGRAM(s) Oral two times a day  influenza   Vaccine 0.5 milliLiter(s) IntraMuscular once  montelukast 10 milliGRAM(s) Oral daily  rivaroxaban 20 milliGRAM(s) Oral with dinner    MEDICATIONS  (PRN):  cyclobenzaprine 10 milliGRAM(s) Oral three times a day PRN Muscle Spasm  oxyCODONE    IR 10 milliGRAM(s) Oral every 12 hours PRN Moderate Pain (4 - 6)      ALLERGIES:  Allergies    No Known Allergies    Intolerances        OBJECTIVE:  ICU Vital Signs Last 24 Hrs  T(C): 36.4 (02 Mar 2022 20:00), Max: 36.4 (02 Mar 2022 16:00)  T(F): 97.6 (02 Mar 2022 20:00), Max: 97.6 (02 Mar 2022 20:00)  HR: 68 (03 Mar 2022 07:00) (63 - 93)  BP: 133/63 (03 Mar 2022 07:00) (112/57 - 172/96)  BP(mean): 90 (03 Mar 2022 07:00) (81 - 124)  ABP: --  ABP(mean): --  RR: 22 (03 Mar 2022 07:00) (13 - 31)  SpO2: 95% (03 Mar 2022 07:00) (95% - 99%)      Adult Advanced Hemodynamics Last 24 Hrs  CVP(mm Hg): --  CVP(cm H2O): --  CO: --  CI: --  PA: --  PA(mean): --  PCWP: --  SVR: --  SVRI: --  PVR: --  PVRI: --  CAPILLARY BLOOD GLUCOSE        CAPILLARY BLOOD GLUCOSE        I&O's Summary    02 Mar 2022 07:01  -  03 Mar 2022 07:00  --------------------------------------------------------  IN: 740 mL / OUT: 2525 mL / NET: -1785 mL      Daily Height in cm: 165.1 (02 Mar 2022 12:00)    Daily     PHYSICAL EXAMINATION:  General: WN/WD NAD  HEENT: PERRLA, EOMI, moist mucous membranes  Neurology: A&Ox3, nonfocal, BOURGEOIS x 4  Respiratory: CTA B/L, normal respiratory effort, no wheezes, crackles, rales  CV: RRR, S1S2, no murmurs, rubs or gallops  Abdominal: Soft, NT, ND +BS, Last BM  Extremities: No edema, + peripheral pulses  Incisions:   Tubes:    LABS:                          13.2   5.53  )-----------( 207      ( 03 Mar 2022 04:45 )             41.0     03-03    141  |  104  |  13  ----------------------------<  101<H>  4.7   |  27  |  0.7    Ca    9.1      03 Mar 2022 04:45  Mg     2.0     03-03    TPro  6.8  /  Alb  4.4  /  TBili  0.3  /  DBili  x   /  AST  17  /  ALT  16  /  AlkPhos  96  03-02    LIVER FUNCTIONS - ( 02 Mar 2022 09:56 )  Alb: 4.4 g/dL / Pro: 6.8 g/dL / ALK PHOS: 96 U/L / ALT: 16 U/L / AST: 17 U/L / GGT: x           PT/INR - ( 02 Mar 2022 09:56 )   PT: 10.00 sec;   INR: 0.87 ratio         PTT - ( 02 Mar 2022 09:56 )  PTT:28.0 sec  Troponin T, Serum: <0.01 ng/mL (03-02 @ 09:56)    CARDIAC MARKERS ( 02 Mar 2022 09:56 )  x     / <0.01 ng/mL / x     / x     / x              TELEMETRY:  no significant events  EKG:   Resolving ST depressions  IMAGING:  CTA: negative for PE

## 2022-03-03 NOTE — DISCHARGE NOTE PROVIDER - CARE PROVIDER_API CALL
Jordy Smalls)  Cardiovascular Disease; Internal Medicine  [address] [place] [address]  Phone: [phone]  Fax: [phone]  Follow Up Time: 2 weeks    Ervin Tran)  Gastroenterology; Internal Medicine  [address] [place] [address]  Phone: [phone]  Fax: [phone]  Follow Up Time: 2 weeks

## 2022-03-03 NOTE — DISCHARGE NOTE PROVIDER - HOSPITAL COURSE
65 YO F hx of Chronic Essential HTN, HLD, DVT on Xarelto presented with complaints of CP x 3 days PTA Patient described the pain as acute onset. Left sided, non-pleuritic, non-reproducible in nature. Pain worsens with exertion and associated with SOB. Patient seen by PCP yesterday and was told to came to ED. In the ED, patient remained HD stable. Continued to experience pain. Code STEMI called d/t small BRAD in aVR but then cancelled, d/t high baldemar score taken urgently for LHC which revealed, small OM1 Dz.    CNS: Avoid sedatives    HEENT: Oral care    PULMONARY:  HOB @ 45 degrees. ORA    CARDIOVASCULAR:  - Cath reveals non-obstructive CAD. No signs of ACS.  - Start amlodipine given concern for prinzmetal angina.  - Continue tele monitoring.  - TTE appreciated with largely normal results.    GI: GI prophylaxis.   - Will need GI w/u outpatient to r/o GI etiology for chest pain.    RENAL:   Strict I/O. Avoid nephrotoxic agents    INFECTIOUS DISEASE:     HEMATOLOGICAL:   Monitor CBC    ENDOCRINE:  Follow up FS.  Insulin protocol; F/U A1c  f/u Lipid profile    GI ppx: Pantoprazole  Dispo: Encourage ambulation. Likely dc later today or tomorrow.  Code status: FULL

## 2022-03-03 NOTE — DISCHARGE NOTE NURSING/CASE MANAGEMENT/SOCIAL WORK - PATIENT PORTAL LINK FT
You can access the FollowMyHealth Patient Portal offered by [facility] by registering at the following website: [email or web address]. By joining [employer]’s FollowMyHealth portal, you will also be able to view your health information using other applications (apps) compatible with our system.

## 2022-03-03 NOTE — PROGRESS NOTE ADULT - ASSESSMENT
IMPRESSION:  Atypical chest pain    PLAN:    CNS: Avoid sedatives    HEENT: Oral care    PULMONARY:  HOB @ 45 degrees. ORA    CARDIOVASCULAR:  - Cath reveals non-obstructive CAD. No signs of ACS.  - Start amlodipine given concern for prinzmetal angina.  - Continue tele monitoring.  - TTE appreciated with largely normal results.    GI: GI prophylaxis.   - Will need GI w/u outpatient to r/o GI etiology for chest pain.    RENAL:   Strict I/O. Avoid nephrotoxic agents    INFECTIOUS DISEASE:     HEMATOLOGICAL:   Monitor CBC    ENDOCRINE:  Follow up FS.  Insulin protocol; F/U A1c  f/u Lipid profile    GI ppx: Pantoprazole  Dispo: Encourage ambulation. Likely dc later today or tomorrow.  Code status: FULL

## 2022-03-03 NOTE — DISCHARGE NOTE PROVIDER - NSDCCPCAREPLAN_GEN_ALL_CORE_FT
PRINCIPAL DISCHARGE DIAGNOSIS  Diagnosis: Chest pain at rest  Assessment and Plan of Treatment: You came to the hospital with chest pain at rest and was found to have no significant blocakages on your coronary arteries. You will need to continue taking anti-hypertensive medications as indicated. Please follow up with a gastroenterologist for reflux disease.

## 2022-03-03 NOTE — DISCHARGE NOTE PROVIDER - CARE PROVIDERS DIRECT ADDRESSES
[email or web address]

## 2022-03-03 NOTE — PHYSICAL THERAPY INITIAL EVALUATION ADULT - GENERAL OBSERVATIONS, REHAB EVAL
Pt encountered supine in bed in NAD, +tele, no c/o pain and agreeable with PT. Pt performed bed mobility with supervision, transfer mobility CGA, and ambulated 30 ft CGA using RW. Pt demonstrates limited ambulation secondary fatigue easily. Pt left in bed as found after therapy session.

## 2022-03-03 NOTE — DISCHARGE NOTE PROVIDER - DISCHARGE DATE
Please see [employer] [employer] message below and advise.   Mahsa Winkler RN   [facility] - Triage     
03-Mar-2022

## 2022-03-03 NOTE — DISCHARGE NOTE PROVIDER - PROVIDER TOKENS
PROVIDER:[TOKEN:[[id number]],FOLLOWUP:[2 weeks]],PROVIDER:[TOKEN:[[id number]],FOLLOWUP:[2 weeks]]

## 2022-03-03 NOTE — DISCHARGE NOTE NURSING/CASE MANAGEMENT/SOCIAL WORK - NSDCPEFALRISK_GEN_ALL_CORE
For information on Fall & Injury Prevention, visit: [email or web address] OR  [email or web address] OR  https://www.cdc.gov/steadi/patient.html

## 2022-05-03 NOTE — ED ADULT TRIAGE NOTE - CCCP TRG CHIEF CMPLNT
chest pain Acitretin Counseling:  I discussed with the patient the risks of acitretin including but not limited to hair loss, dry lips/skin/eyes, liver damage, hyperlipidemia, depression/suicidal ideation, photosensitivity.  Serious rare side effects can include but are not limited to pancreatitis, pseudotumor cerebri, bony changes, clot formation/stroke/heart attack.  Patient understands that alcohol is contraindicated since it can result in liver toxicity and significantly prolong the elimination of the drug by many years.

## 2022-12-06 NOTE — ED PROVIDER NOTE - NS ED ROS FT
[FreeTextEntry6] : vomiting / stomach pain and headache \par + body aches yesterday \par 102.7 this AM \par entire class with Flu A last week  Constitutional: (-) fever, (-) chills  Eyes: (-) visual changes  ENT: (-) nasal congestions  Cardiovascular: (+) chest pain, (-) syncope  Respiratory: (-) cough, (+) shortness of breath, (-) dyspnea,   Gastrointestinal: (-) vomiting, (-) diarrhea, (-)nausea,  Musculoskeletal: (-) neck pain, (-) back pain, (-) joint pain,  Integumentary: (-) rash, (-) edema, (-) bruises  Neurological: (-) headache, (-) loc, (-) dizziness, (-) tingling, (-)numbness,  Peripheral Vascular: (-) leg swelling  :  (-)dysuria,  (-) hematuria  Allergic/Immunologic: (-) pruritus

## 2025-06-16 ENCOUNTER — EMERGENCY (EMERGENCY)
Facility: HOSPITAL | Age: 70
LOS: 0 days | Discharge: HOME | End: 2025-06-17
Attending: EMERGENCY MEDICINE
Payer: MEDICARE

## 2025-06-16 VITALS
TEMPERATURE: 98 F | SYSTOLIC BLOOD PRESSURE: 132 MMHG | WEIGHT: 145.06 LBS | HEART RATE: 90 BPM | DIASTOLIC BLOOD PRESSURE: 82 MMHG | HEIGHT: 65 IN | OXYGEN SATURATION: 97 % | RESPIRATION RATE: 20 BRPM

## 2025-06-16 DIAGNOSIS — R22.1 LOCALIZED SWELLING, MASS AND LUMP, NECK: ICD-10-CM

## 2025-06-16 DIAGNOSIS — E78.5 HYPERLIPIDEMIA, UNSPECIFIED: ICD-10-CM

## 2025-06-16 DIAGNOSIS — K21.9 GASTRO-ESOPHAGEAL REFLUX DISEASE WITHOUT ESOPHAGITIS: ICD-10-CM

## 2025-06-16 DIAGNOSIS — I10 ESSENTIAL (PRIMARY) HYPERTENSION: ICD-10-CM

## 2025-06-16 DIAGNOSIS — Z86.718 PERSONAL HISTORY OF OTHER VENOUS THROMBOSIS AND EMBOLISM: ICD-10-CM

## 2025-06-16 DIAGNOSIS — Z79.01 LONG TERM (CURRENT) USE OF ANTICOAGULANTS: ICD-10-CM

## 2025-06-16 LAB
ALBUMIN SERPL ELPH-MCNC: 4.2 G/DL — SIGNIFICANT CHANGE UP (ref 3.5–5.2)
ALP SERPL-CCNC: 66 U/L — SIGNIFICANT CHANGE UP (ref 30–115)
ALT FLD-CCNC: 17 U/L — SIGNIFICANT CHANGE UP (ref 0–41)
ANION GAP SERPL CALC-SCNC: 10 MMOL/L — SIGNIFICANT CHANGE UP (ref 7–14)
AST SERPL-CCNC: 19 U/L — SIGNIFICANT CHANGE UP (ref 0–41)
BASOPHILS # BLD AUTO: 0.04 K/UL — SIGNIFICANT CHANGE UP (ref 0–0.2)
BASOPHILS NFR BLD AUTO: 0.8 % — SIGNIFICANT CHANGE UP (ref 0–1)
BILIRUB SERPL-MCNC: 0.3 MG/DL — SIGNIFICANT CHANGE UP (ref 0.2–1.2)
BUN SERPL-MCNC: 13 MG/DL — SIGNIFICANT CHANGE UP (ref 10–20)
CALCIUM SERPL-MCNC: 9.4 MG/DL — SIGNIFICANT CHANGE UP (ref 8.4–10.5)
CHLORIDE SERPL-SCNC: 104 MMOL/L — SIGNIFICANT CHANGE UP (ref 98–110)
CO2 SERPL-SCNC: 31 MMOL/L — SIGNIFICANT CHANGE UP (ref 17–32)
CREAT SERPL-MCNC: 0.7 MG/DL — SIGNIFICANT CHANGE UP (ref 0.7–1.5)
EGFR: 95 ML/MIN/1.73M2 — SIGNIFICANT CHANGE UP
EGFR: 95 ML/MIN/1.73M2 — SIGNIFICANT CHANGE UP
EOSINOPHIL # BLD AUTO: 0.19 K/UL — SIGNIFICANT CHANGE UP (ref 0–0.7)
EOSINOPHIL NFR BLD AUTO: 3.9 % — SIGNIFICANT CHANGE UP (ref 0–8)
GLUCOSE SERPL-MCNC: 90 MG/DL — SIGNIFICANT CHANGE UP (ref 70–99)
HCT VFR BLD CALC: 41.1 % — SIGNIFICANT CHANGE UP (ref 37–47)
HGB BLD-MCNC: 13.1 G/DL — SIGNIFICANT CHANGE UP (ref 12–16)
IMM GRANULOCYTES NFR BLD AUTO: 0.2 % — SIGNIFICANT CHANGE UP (ref 0.1–0.3)
LYMPHOCYTES # BLD AUTO: 2.11 K/UL — SIGNIFICANT CHANGE UP (ref 1.2–3.4)
LYMPHOCYTES # BLD AUTO: 43.1 % — SIGNIFICANT CHANGE UP (ref 20.5–51.1)
MCHC RBC-ENTMCNC: 25.3 PG — LOW (ref 27–31)
MCHC RBC-ENTMCNC: 31.9 G/DL — LOW (ref 32–37)
MCV RBC AUTO: 79.5 FL — LOW (ref 81–99)
MONOCYTES # BLD AUTO: 0.65 K/UL — HIGH (ref 0.1–0.6)
MONOCYTES NFR BLD AUTO: 13.3 % — HIGH (ref 1.7–9.3)
NEUTROPHILS # BLD AUTO: 1.9 K/UL — SIGNIFICANT CHANGE UP (ref 1.4–6.5)
NEUTROPHILS NFR BLD AUTO: 38.7 % — LOW (ref 42.2–75.2)
NRBC BLD AUTO-RTO: 0 /100 WBCS — SIGNIFICANT CHANGE UP (ref 0–0)
PLATELET # BLD AUTO: 239 K/UL — SIGNIFICANT CHANGE UP (ref 130–400)
PMV BLD: 11.4 FL — HIGH (ref 7.4–10.4)
POTASSIUM SERPL-MCNC: 4.3 MMOL/L — SIGNIFICANT CHANGE UP (ref 3.5–5)
POTASSIUM SERPL-SCNC: 4.3 MMOL/L — SIGNIFICANT CHANGE UP (ref 3.5–5)
PROT SERPL-MCNC: 6.8 G/DL — SIGNIFICANT CHANGE UP (ref 6–8)
RBC # BLD: 5.17 M/UL — SIGNIFICANT CHANGE UP (ref 4.2–5.4)
RBC # FLD: 15 % — HIGH (ref 11.5–14.5)
SODIUM SERPL-SCNC: 145 MMOL/L — SIGNIFICANT CHANGE UP (ref 135–146)
WBC # BLD: 4.9 K/UL — SIGNIFICANT CHANGE UP (ref 4.8–10.8)
WBC # FLD AUTO: 4.9 K/UL — SIGNIFICANT CHANGE UP (ref 4.8–10.8)

## 2025-06-16 PROCEDURE — 76882 US LMTD JT/FCL EVL NVASC XTR: CPT | Mod: 26,LT

## 2025-06-16 PROCEDURE — 85025 COMPLETE CBC W/AUTO DIFF WBC: CPT

## 2025-06-16 PROCEDURE — 74177 CT ABD & PELVIS W/CONTRAST: CPT

## 2025-06-16 PROCEDURE — 71260 CT THORAX DX C+: CPT | Mod: 26

## 2025-06-16 PROCEDURE — 80053 COMPREHEN METABOLIC PANEL: CPT

## 2025-06-16 PROCEDURE — 76882 US LMTD JT/FCL EVL NVASC XTR: CPT | Mod: LT

## 2025-06-16 PROCEDURE — 70491 CT SOFT TISSUE NECK W/DYE: CPT | Mod: 26

## 2025-06-16 PROCEDURE — 99285 EMERGENCY DEPT VISIT HI MDM: CPT

## 2025-06-16 PROCEDURE — 36000 PLACE NEEDLE IN VEIN: CPT

## 2025-06-16 PROCEDURE — 70491 CT SOFT TISSUE NECK W/DYE: CPT

## 2025-06-16 PROCEDURE — 74177 CT ABD & PELVIS W/CONTRAST: CPT | Mod: 26

## 2025-06-16 PROCEDURE — 71260 CT THORAX DX C+: CPT

## 2025-06-16 PROCEDURE — 99284 EMERGENCY DEPT VISIT MOD MDM: CPT | Mod: 25

## 2025-06-16 PROCEDURE — 36415 COLL VENOUS BLD VENIPUNCTURE: CPT

## 2025-06-16 NOTE — ED PROVIDER NOTE - CLINICAL SUMMARY MEDICAL DECISION MAKING FREE TEXT BOX
Received sign out from Dr. Peres -- patient presented with soft, well circumscribed swelling to L side of neck, found to have ?venous aneurysm, Was seen by vascular resident who spoke with fellow. No indication for admission, emergent intervention, can be discharged with outpatient follow up with vascular.

## 2025-06-16 NOTE — ED PROVIDER NOTE - PATIENT PORTAL LINK FT
You can access the FollowMyHealth Patient Portal offered by [facility] by registering at the following website: [email or web address]. By joining [facility]’s FollowMyHealth portal, you will also be able to view your health information using other applications (apps) compatible with our system.

## 2025-06-16 NOTE — ED PROVIDER NOTE - PHYSICAL EXAMINATION
VITAL SIGNS: I have reviewed nursing notes and confirm.  CONSTITUTIONAL: Well-developed; well-nourished; in no acute distress.  SKIN: Skin exam is warm and dry, no acute rash.  HEAD: Normocephalic; atraumatic.  NECK: Supple; Tender left submandibular lymph node.  Outpouching/vessel noted next to the left external jugular.  CARD: S1, S2 normal; no murmurs, gallops, or rubs. Regular rate and rhythm.  RESP: Normal respiratory effort, no tachypnea or distress. Lungs CTAB, no wheezes, rales or rhonchi.  ABD: soft, NT/ND.  NEURO: Alert, oriented. Grossly unremarkable. No focal deficits.  PSYCH: Cooperative, appropriate.

## 2025-06-16 NOTE — ED PROVIDER NOTE - NSFOLLOWUPINSTRUCTIONS_ED_ALL_ED_FT
Our Emergency Department Referral Coordinators will be reaching out to you in the next 24-48 hours from 9:00am to 5:00pm to schedule a follow up appointment with vascular surgery. Please expect a phone call from the hospital in that time frame. If you do not receive a call or if you have any questions or concerns, you can reach them at [phone].  ----  Vascular Malformation    A vascular malformation is a defect in the development of a blood or lymph vessel. Vascular malformations are present at birth, but they may not cause signs or symptoms until years later. Vascular malformations can occur anywhere that blood or lymph circulates in your body. Lymph is fluid that is part of the body's disease-fighting (immune) system.    There are four types of vascular malformations.  - Venous malformations. These affect the veins, which are blood vessels that carry blood back to the heart. These are the most common type.  - Arteriovenous malformations. These involve veins and arteries. Arteries are blood vessels that carry oxygen-rich blood away from the heart.  - Lymphatic malformations. These affect lymph vessels. These vessels carry fluid through the body that helps to fight infections.  - Capillary malformations. These affect capillaries, which are tiny blood vessels that connect veins to arteries.    What are the causes?  This condition is caused by a defect that occurs before a child is born. In most cases, the cause of the error is not known. In some cases, it may be caused by a change in a gene (mutation).    What are the signs or symptoms?  The main symptom of this condition is a skin blemish or lump that swells, bleeds, or is painful. Other signs and symptoms depend on the type, location, and size of the malformation.    Venous malformations. These commonly appear on the face, arms, legs, or the area between the neck and groin (torso). They may have a bluish appearance.    Arteriovenous malformations. These have different symptoms depending on where they grow in the body.  Brain malformations may cause a headache or seizure. They can also cause weakness, dizziness, confusion, loss of speech, or other symptoms of a bleeding (hemorrhagic) stroke. These malformations can be life-threatening.    Malformations in the head or neck may cause vision changes or difficulty with breathing or swallowing.    Spinal cord malformations may cause back pain, weakness, or inability to feel or move (paralysis).    Chest malformations may cause shortness of breath, fatigue, a cough, or coughing up blood.    Lymphatic malformations. These cause swelling on the face and neck.    Capillary malformations. These may look like a purple stain (port-wine stain) on the face. Over time, the stain may become darker and the skin may become thicker.    How is this diagnosed?  This condition may be diagnosed based on:  - A physical exam. Some malformations can be seen or felt on the body.  - Tests that create detailed images of the body. These may include:  - Ultrasound. This test uses sound waves to make images.  - MRI. This test is used to diagnose deep vascular malformations.  - CT scan. This uses X-rays to make a three-dimensional image.  - Angiogram. This is a type of X-ray that is taken after a dye is injected into an artery. This imaging is most important for diagnosing arteriovenous malformations.    How is this treated?  Treatment depends on the type, size, and location of the malformation as well as on any symptoms. In some cases, treatment may not be needed. The malformation will be checked often to see if it is growing. If needed, treatment may include:  - Laser treatment. This may be used to treat malformations near the skin. This is often the best treatment for capillary malformations.  - Embolization. This treatment blocks blood flow and shrinks the malformation.  - Sclerotherapy. An irritating substance is inserted into a blood vessel or lymphatic vessel. This causes the vessel to shrink and become a scar.  - Radiosurgery. This is an X-ray treatment to destroy a specific spot or to shrink a malformation. You may have this treatment to treat arteriovenous malformations in the brain or spinal cord.  - Surgery. Some vascular malformations can be removed surgically. You may have other treatments to shrink the malformation before surgery.    Follow these instructions at home:    Take over-the-counter and prescription medicines only as told by your health care provider.  Return to your normal activities as told by your health care provider. Ask your health care provider what activities are safe for you.  Keep all follow-up visits. This is important.    Contact a health care provider if:  - You have a fever.  - You have pain.  - You have a vascular malformation near your skin that changes size, bleeds, or becomes painful.  - You have a cough.  - You have difficulty swallowing.  - You have a headache, backache, or loss of feeling (numbness).  - You have dizziness, weakness, or confusion.    Get help right away if:  - You have trouble breathing.  - You have severe bleeding.  - You cough up blood.  - You have a severe headache or back pain.  - You have loss of movement.  - You have any symptoms of a stroke. "BE FAST" is an easy way to remember the main warning signs of a stroke:  B - Balance. Signs are dizziness, sudden trouble walking, or loss of balance.  E - Eyes. Signs are trouble seeing or a sudden change in vision.  F - Face. Signs are sudden weakness or numbness of the face, or the face or eyelid drooping on one side.  A - Arms. Signs are weakness or numbness in an arm. This happens suddenly and usually on one side of the body.  S - Speech. Signs are sudden trouble speaking, slurred speech, or trouble understanding what people say.  T - Time. Time to call emergency services. Write down what time symptoms started.  - You have other signs of a stroke, such as:        - A sudden, severe headache with no known cause.        - Nausea or vomiting.        - Seizure.  These symptoms may represent a serious problem that is an emergency. Do not wait to see if the symptoms will go away. Get medical help right away. Call your local emergency services (911 in the [place] Do not drive yourself to the hospital.    Summary  A vascular malformation is an abnormal development of a blood or lymph vessel that you are born with.  Symptoms depend on the type, size, and location of the malformation that you have.  You might not develop symptoms for many years.  Imaging studies are needed to diagnose vascular malformations inside the body.  The best treatment will depend on the type, size, symptoms, and location of the malformation.  This information is not intended to replace advice given to you by your health care provider. Make sure you discuss any questions you have with your health care provider.

## 2025-06-16 NOTE — CONSULT NOTE ADULT - ASSESSMENT
Assessment:  69 y.o. F w/ PMH of HTN, HLD, GERD, DVT of LLE (7 years ago) on xarelto since who presents with lump to L neck that is present for about 5 month and is getting bigger since.     Vascular surgery got consulted for L neck lump concern for venous aneurism.    Plan:  ***INCOMPLETE NOTE***    Above plan discussed with fellow Dr. Baltazar Assessment:  69 y.o. F w/ PMH of HTN, HLD, GERD, DVT of LLE (7 years ago) on xarelto since who presents with lump to L neck that is present for about 5 month and is getting bigger since.     Vascular surgery got consulted for L neck lump concern for venous aneurism.    Plan:  - No acute vascular surgery intervention  - Pt is on Xarelto  - Pt can f/u outpatient w/ Dr. Garcia     Above plan discussed with fellow Dr. Baltazar

## 2025-06-16 NOTE — ED PROVIDER NOTE - PROGRESS NOTE DETAILS
TC: Labs wnl. CT chest/abd/pelv negative. CT neck prelim read with venous aneurysm vs enlarged necrotic lymph node. POCUS more c/w focal venous aneurysm near EJ. Vascular surgery consulted. TC: Signed out to Dr. Youngblood to f/u vascular reccs. TC: Labs wnl. CT chest/abd/pelv negative. CT neck prelim read with venous aneurysm vs enlarged necrotic lymph node. POCUS more c/w focal venous aneurysm near EJ. Discussed findings and plan with jamari Saldivar over phone. Vascular surgery consulted.

## 2025-06-16 NOTE — ED PROVIDER NOTE - OBJECTIVE STATEMENT
69-year-old female with a past medical history of hypertension, hyperlipidemia, DVT on Xarelto presents to the emergency department with 3 weeks to of swelling to the left side of her neck.  Patient notes that the swelling is next to her vein and has been getting bigger in the past few days.  Patient also complains of swelling of the left submandibular lymph node which is also tender to palpation.  Patient reports changes to her voice stating that her voice is somewhat shaky.  Denies dysphagia, fever, chills, night sweats.  Patient states that she has lost weight but has been dieting.

## 2025-06-16 NOTE — ED PROVIDER NOTE - NSICDXPASTMEDICALHX_GEN_ALL_CORE_FT
PAST MEDICAL HISTORY:  Deep vein thrombosis (DVT)     GERD (gastroesophageal reflux disease)     HLD (hyperlipidemia)     HTN (hypertension)

## 2025-06-16 NOTE — CONSULT NOTE ADULT - SUBJECTIVE AND OBJECTIVE BOX
VASCULAR SURGERY CONSULT NOTE    HPI:  69 y.o. F w/ PMH of HTN, HLD, GERD, DVT of LLE (7 years ago) on xarelto since who presents with lump to L neck that is present for about 5 month and is getting bigger since. The lump is soft and mobile, was not painful till past few days and now feels like there is painful swelling to other part of L neck and part of R neck as well. Pt is stating when pressing on it is mildly painful but pt is experiencing difficulty with swallowing. Pt also endorses change in voice as well. Denies any fever, night sweats, sore throat. Pt never smoked. Has family Hs of thyroid CA in uncle uterine CA in 2 aunts, lung cancer in sister (not a smoker).    Vascular surgery got consulted for L neck lump concern for venous aneurism.    On PE: soft mobile round mass on the left side of the neck, easily compressible Getting bigger with lying flat and with sitting almost disappears. No bruit felt over the lump.    PAST MEDICAL & SURGICAL HISTORY:  HTN (hypertension)      HLD (hyperlipidemia)      Deep vein thrombosis (DVT)      GERD (gastroesophageal reflux disease)        No Known Allergies    Home Medications:  cyclobenzaprine 10 mg oral tablet: 1 tab(s) orally 3 times a day, As needed, Muscle Spasm (03 Mar 2022 11:18)  montelukast 10 mg oral tablet: 1 tab(s) orally once a day (03 Mar 2022 11:18)    PHYSICAL EXAM  Vital Signs Last 24 Hrs  T(C): 36.8 (16 Jun 2025 16:27), Max: 36.8 (16 Jun 2025 16:27)  T(F): 98.2 (16 Jun 2025 16:27), Max: 98.2 (16 Jun 2025 16:27)  HR: 90 (16 Jun 2025 16:27) (90 - 90)  BP: 132/82 (16 Jun 2025 16:27) (132/82 - 132/82)  BP(mean): --  RR: 20 (16 Jun 2025 16:27) (20 - 20)  SpO2: 97% (16 Jun 2025 16:27) (97% - 97%)    Parameters below as of 16 Jun 2025 16:27  Patient On (Oxygen Delivery Method): room air    Appearance: Normal	  HEENT: Normal oral mucosa, EOMI	  Neck: L sided lump that is getting bigger with supine and getting smaller with sitting   Cardiovascular: RRR  Respiratory: On RA, saturating well, b/l chest rise and fall	  Skin: No rashes, No ecchymoses, No cyanosis  Extremities: Normal range of motion, No clubbing, cyanosis or edema  Neurologic: Non-focal deficit  Psychiatry: A & O x 3, Mood & affect appropriate    MEDICATIONS:   MEDICATIONS  (STANDING):    MEDICATIONS  (PRN):      LAB/STUDIES:                        13.1   4.90  )-----------( 239      ( 16 Jun 2025 18:00 )             41.1     06-16    145  |  104  |  13  ----------------------------<  90  4.3   |  31  |  0.7    Ca    9.4      16 Jun 2025 18:00    TPro  6.8  /  Alb  4.2  /  TBili  0.3  /  DBili  x   /  AST  19  /  ALT  17  /  AlkPhos  66  06-16      LIVER FUNCTIONS - ( 16 Jun 2025 18:00 )  Alb: 4.2 g/dL / Pro: 6.8 g/dL / ALK PHOS: 66 U/L / ALT: 17 U/L / AST: 19 U/L / GGT: x             Urinalysis Basic - ( 16 Jun 2025 18:00 )    Color: x / Appearance: x / SG: x / pH: x  Gluc: 90 mg/dL / Ketone: x  / Bili: x / Urobili: x   Blood: x / Protein: x / Nitrite: x   Leuk Esterase: x / RBC: x / WBC x   Sq Epi: x / Non Sq Epi: x / Bacteria: x      IMAGING:  < from: POCUS ED MSK or Joint limited, Left (06.16.25 @ 22:23) >  Findings: compressible venous structure near external jugular vein with   nonpulsatile venous flow    Impression: likely venous aneurysm    < end of copied text >  < from: CT Neck Soft Tissue w/ IV Cont (06.16.25 @ 20:15) >  Well-circumscribed rounded structure abutting the left external   jugular vein with noted faint enhancement, new since prior CT cervical   spine 9/14/2014. Findings may reflect venous aneurysm versus enlarged   necrotic lymph node.    < end of copied text >

## 2025-06-16 NOTE — ED PROVIDER NOTE - DIFFERENTIAL DIAGNOSIS
Differential Diagnosis differential dx includes but is not limited to:   EJ/IJ DVT, malignancy, SVC syndrome

## 2025-06-16 NOTE — CONSULT NOTE ADULT - CONSULT REQUESTED BY NAME
ED

## 2025-06-16 NOTE — ED PROVIDER NOTE - ATTENDING CONTRIBUTION TO CARE
68 yo F with hx of HTN, HLD, GERD, DVT on xarelto who presents with lump to L neck x2-3 mo which got bigger over past 2-3 wks. Didn't have pain until past few days and now feels like there is painful swelling to other part of L neck and part of R neck as well. Endorses change in voice as well. No fever, night sweats, dental pain, sore throat, ear pain. Says she lost some weight after taking gluten out of her diet. Went to PMD who sent to ED for further eval.    PMD Dr. Burch    CONSTITUTIONAL: well developed, nontoxic appearing, in no acute distress, speaking in full sentences  SKIN: warm, dry, no rash, cap refill < 2 seconds  HEENT: normocephalic, atraumatic, no conjunctival erythema, moist mucous membranes, patent airway, no dental swelling/tenderness, no tonsillar erythema/swelling/exudates  NECK: supple, mobile localized swelling/lump to L anterior neck overlying EJ which somewhat increases with valsalva maneuver, no bruit, tender L anterior/posterior cervical lymphadenopathy, no obvious supraclavicular lymph node  CV:  regular rate, regular rhythm, 2+ radial pulses bilaterally  RESP: no wheezes, no rales, no rhonchi, normal work of breathing  ABD: soft, no tenderness, nondistended, no rebound, no guarding  MSK: normal ROM, no cyanosis, no edema  NEURO: alert, oriented, grossly unremarkable  PSYCH: cooperative, appropriate    A&P:  Pt here with L neck swelling/tenderness. Has mobile mass to L neck which feels somewhat like a lipoma however on POCUS appears to be a vein, possible venous aneurysm? Also with tenderness L cervical lymphadenopathy, concern for possible malignancy. Plan for labs, imaging r/o EJ/IJ DVT, malignancy, SVC syndrome.

## 2025-06-17 VITALS
HEART RATE: 65 BPM | SYSTOLIC BLOOD PRESSURE: 163 MMHG | DIASTOLIC BLOOD PRESSURE: 96 MMHG | TEMPERATURE: 98 F | OXYGEN SATURATION: 99 % | RESPIRATION RATE: 19 BRPM

## 2025-06-17 PROBLEM — I10 ESSENTIAL (PRIMARY) HYPERTENSION: Chronic | Status: ACTIVE | Noted: 2022-03-02

## 2025-06-18 NOTE — CHART NOTE - NSCHARTNOTEFT_GEN_A_CORE
Sent to [facility] Chat 6/17- AC. Appt scheduled 06/23/2025 02:15 PM w/ Dr. Messina @ [address] [place] [address] 6/18 (vascular surgery referral).

## 2025-06-23 ENCOUNTER — APPOINTMENT (OUTPATIENT)
Age: 70
End: 2025-06-23
Payer: MEDICARE

## 2025-06-23 VITALS
DIASTOLIC BLOOD PRESSURE: 72 MMHG | HEIGHT: 65 IN | HEART RATE: 96 BPM | BODY MASS INDEX: 23.32 KG/M2 | WEIGHT: 140 LBS | SYSTOLIC BLOOD PRESSURE: 107 MMHG

## 2025-06-23 PROCEDURE — 99203 OFFICE O/P NEW LOW 30 MIN: CPT

## 2025-06-23 RX ORDER — METFORMIN HYDROCHLORIDE 750 MG/1
TABLET ORAL
Refills: 0 | Status: ACTIVE | COMMUNITY

## 2025-06-23 RX ORDER — HYDROCORTISONE 5 MG/1
TABLET ORAL
Refills: 0 | Status: ACTIVE | COMMUNITY

## 2025-06-23 RX ORDER — QUINAPRIL HYDROCHLORIDE 5 MG/1
TABLET, FILM COATED ORAL
Refills: 0 | Status: ACTIVE | COMMUNITY

## 2025-06-23 RX ORDER — HYDROCHLOROTHIAZIDE 12.5 MG/1
TABLET ORAL
Refills: 0 | Status: ACTIVE | COMMUNITY

## 2025-06-23 RX ORDER — RIVAROXABAN 2.5 MG/1
TABLET, FILM COATED ORAL
Refills: 0 | Status: ACTIVE | COMMUNITY

## 2025-06-23 RX ORDER — ATORVASTATIN CALCIUM 80 MG/1
TABLET, FILM COATED ORAL
Refills: 0 | Status: ACTIVE | COMMUNITY